# Patient Record
(demographics unavailable — no encounter records)

---

## 2024-11-12 NOTE — HISTORY OF PRESENT ILLNESS
[FreeTextEntry2] : Rolf is a 13-year 3-month-old who is referred for evaluation of his growth.  According to dad he appears to be smaller as compared to his peers.  Review of the pediatricians growth curve indicates relatively steady linear growth below the fifth centile throughout childhood, weight is also below the fifth centile and BMI is between less than the third to the 10th centile.   Blood work was performed which indicated normal thyroid functions with T4 9.1 UG/DL, TSH 2.48M IU/mL normal CMP, vitamin D 34/mL Rolf is a generally healthy boy, he has not needed to see any other specialists, he reports that he does not eat a lot and his parents would like him to eat more.  Dad has a history of growing late eruc is a hethay boy  Rolf had a bone age performed in August 2024 which was read by radiology as 13, at chronologic age 13, I read it as between 12-1/2-13

## 2024-11-12 NOTE — PAST MEDICAL HISTORY
[Normal Vaginal Route] : by normal vaginal route [None] : there were no delivery complications [Age Appropriate] : age appropriate developmental milestones met [de-identified] : 3. 5

## 2025-03-25 NOTE — HISTORY OF PRESENT ILLNESS
[FreeTextEntry2] : Rolf is a 13-year 7-month-old who presents for follow up of growth. He was first seen in 11/24.  According to dad he appears to be smaller as compared to his peers.  Review of the pediatricians growth curve indicates relatively steady linear growth below the fifth centile throughout childhood, weight is also below the fifth centile and BMI is between less than the third to the 10th centile.   Blood work was performed which indicated normal thyroid functions with T4 9.1 UG/DL, TSH 2.48M IU/mL normal CMP, vitamin D 34/mL Rolf is a generally healthy boy, he has not needed to see any other specialists, he reports that he does not eat a lot and his parents would like him to eat more.  Dad has a history of growing late  Rolf had a bone age performed in August 2024 which was read by radiology as 13, at chronologic age 13, I read it as between 12-1/2 to 13 On exam Rolf was an early pubertal 13-year-old with height on the third centile and BMI on the fifth centile.  Linear growth had been relatively stable throughout childhood, BMI has remained relatively low.  Rolf's height prediction utilizing a bone age of 12 years 9 months is 65 inches and it is 64.13 inches with a bone age of 13.  His mean parental height calculates out to 68 inches so it is possible that he is growing at the lower end for genetic background. Due to his rather poor predicted height I  obtained blood work today including celiac screening, growth factors and testing for SHOX mutation/deletion.  All blood work was  normal including SHOX testing,   Rolf returns today, he has been well. Has neded some size increase   he has needed bogger clothing   dad reprt that he sti has a ppor ppatie

## 2025-03-25 NOTE — PAST MEDICAL HISTORY
[Normal Vaginal Route] : by normal vaginal route [None] : there were no delivery complications [Age Appropriate] : age appropriate developmental milestones met [de-identified] : 3. 5 kg